# Patient Record
Sex: MALE | Race: WHITE | NOT HISPANIC OR LATINO | Employment: OTHER | URBAN - METROPOLITAN AREA
[De-identification: names, ages, dates, MRNs, and addresses within clinical notes are randomized per-mention and may not be internally consistent; named-entity substitution may affect disease eponyms.]

---

## 2021-12-17 ENCOUNTER — TELEPHONE (OUTPATIENT)
Dept: SCHEDULING | Facility: IMAGING CENTER | Age: 36
End: 2021-12-17

## 2022-01-05 ENCOUNTER — OFFICE VISIT (OUTPATIENT)
Dept: MEDICAL GROUP | Facility: MEDICAL CENTER | Age: 37
End: 2022-01-05
Payer: COMMERCIAL

## 2022-01-05 VITALS
WEIGHT: 157 LBS | SYSTOLIC BLOOD PRESSURE: 128 MMHG | BODY MASS INDEX: 22.48 KG/M2 | OXYGEN SATURATION: 98 % | DIASTOLIC BLOOD PRESSURE: 76 MMHG | RESPIRATION RATE: 16 BRPM | HEART RATE: 78 BPM | TEMPERATURE: 98.7 F | HEIGHT: 70 IN

## 2022-01-05 DIAGNOSIS — Z80.0 FAMILY HISTORY OF LYNCH SYNDROME: ICD-10-CM

## 2022-01-05 DIAGNOSIS — Z00.00 HEALTHCARE MAINTENANCE: ICD-10-CM

## 2022-01-05 DIAGNOSIS — U07.1 COVID-19: ICD-10-CM

## 2022-01-05 PROCEDURE — 99203 OFFICE O/P NEW LOW 30 MIN: CPT | Performed by: STUDENT IN AN ORGANIZED HEALTH CARE EDUCATION/TRAINING PROGRAM

## 2022-01-05 ASSESSMENT — PATIENT HEALTH QUESTIONNAIRE - PHQ9: CLINICAL INTERPRETATION OF PHQ2 SCORE: 0

## 2022-01-05 NOTE — ASSESSMENT & PLAN NOTE
Does not meet [place] criteria for screening as only 1 relative has this diagnosis and diagnosis occurred at age 69 less than 50.  However, discussed genetic screening through the [employer] [facility] and patient is interested in this.  Have indicated that he is interested and should be contacted by [employer] for further testing.

## 2022-01-05 NOTE — PROGRESS NOTES
"Subjective:     CC:  Diagnoses of COVID-19, Healthcare maintenance, and Family history of Dalal syndrome were pertinent to this visit.    HISTORY OF THE PRESENT ILLNESS:   Keith Holt is a pleasant 36-year-old man here to establish care.  Has not had health insurance or a doctor since 2015.    Has recent COVID-19 infection, tested positive about 8 days ago and had only mild symptoms, is feeling better though still has some head cold type symptoms.  No shortness of breath.  Vitals normal.    His father was recently diagnosed with colon cancer and Dalal syndrome.  His father was age 69 at diagnosis.    Health Maintenance: Completed    ROS:   Gen: no fevers/chills, no changes in weight  Eyes: no changes in vision  ENT: no sore throat, no hearing loss, no bloody nose  Pulm: no sob, no cough  CV: no chest pain, no palpitations  GI: no nausea/vomiting, no diarrhea  : no dysuria  MSk: no myalgias  Skin: no rash  Neuro: no headaches, no numbness/tingling  Heme/Lymph: no easy bruising      Objective:       Exam: /76 (BP Location: Left arm)   Pulse 78   Temp 37.1 °C (98.7 °F)   Resp 16   Ht 1.778 m (5' 10\")   Wt 71.2 kg (157 lb)   SpO2 98%  Body mass index is 22.53 kg/m².    General: Normal appearing. No distress.  HEENT: Normocephalic. Eyes conjunctiva clear lids without ptosis, pupils equal and reactive to light accommodation, ears normal shape and contour,  Neck: Supple without JVD or bruit. Thyroid is not enlarged.  Pulmonary: Clear to ausculation.  Normal effort. No rales, ronchi, or wheezing.  Cardiovascular: Regular rate and rhythm without murmur. Carotid and radial pulses are intact and equal bilaterally.  Neurologic: Grossly nonfocal  Lymph: No cervical or supraclavicular lymph nodes are palpable  Skin: Warm and dry.  No obvious lesions.  Musculoskeletal: Normal gait. No extremity cyanosis, clubbing, or edema.  Psych: Normal mood and affect. Alert and oriented x3. Judgment and insight is " normal.      Assessment & Plan:   36 y.o. male with the following -    Problem List Items Addressed This Visit     Family history of Dalal syndrome     Does not meet [place] criteria for screening as only 1 relative has this diagnosis and diagnosis occurred at age 69 less than 50.  However, discussed genetic screening through the [employer] and patient is interested in this.  Have indicated that he is interested and should be contacted by [employer] for further testing.         Healthcare maintenance     We will check routine labs.         Relevant Orders    CBC WITHOUT DIFFERENTIAL    Comp Metabolic Panel    Lipid Profile      Other Visit Diagnoses     COVID-19        Relevant Orders    CBC WITHOUT DIFFERENTIAL    Comp Metabolic Panel    Lipid Profile          Return in about 1 year (around 1/5/2023), or Or sooner if needed.    Please note that this dictation was created using voice recognition software. I have made every reasonable attempt to correct obvious errors, but I expect that there are errors of grammar and possibly content that I did not discover before finalizing the note.

## 2022-08-19 ENCOUNTER — HOSPITAL ENCOUNTER (OUTPATIENT)
Dept: LAB | Facility: MEDICAL CENTER | Age: 37
End: 2022-08-19
Attending: STUDENT IN AN ORGANIZED HEALTH CARE EDUCATION/TRAINING PROGRAM
Payer: COMMERCIAL

## 2022-08-19 DIAGNOSIS — Z00.00 HEALTHCARE MAINTENANCE: ICD-10-CM

## 2022-08-19 DIAGNOSIS — U07.1 COVID-19: ICD-10-CM

## 2022-08-19 LAB
ALBUMIN SERPL BCP-MCNC: 4.8 G/DL (ref 3.2–4.9)
ALBUMIN/GLOB SERPL: 2 G/DL
ALP SERPL-CCNC: 62 U/L (ref 30–99)
ALT SERPL-CCNC: 31 U/L (ref 2–50)
ANION GAP SERPL CALC-SCNC: 9 MMOL/L (ref 7–16)
AST SERPL-CCNC: 16 U/L (ref 12–45)
BILIRUB SERPL-MCNC: 0.9 MG/DL (ref 0.1–1.5)
BUN SERPL-MCNC: 15 MG/DL (ref 8–22)
CALCIUM SERPL-MCNC: 10 MG/DL (ref 8.5–10.5)
CHLORIDE SERPL-SCNC: 101 MMOL/L (ref 96–112)
CHOLEST SERPL-MCNC: 203 MG/DL (ref 100–199)
CO2 SERPL-SCNC: 28 MMOL/L (ref 20–33)
CREAT SERPL-MCNC: 0.65 MG/DL (ref 0.5–1.4)
FASTING STATUS PATIENT QL REPORTED: NORMAL
GFR SERPLBLD CREATININE-BSD FMLA CKD-EPI: 123 ML/MIN/1.73 M 2
GLOBULIN SER CALC-MCNC: 2.4 G/DL (ref 1.9–3.5)
GLUCOSE SERPL-MCNC: 97 MG/DL (ref 65–99)
HDLC SERPL-MCNC: 48 MG/DL
LDLC SERPL CALC-MCNC: 144 MG/DL
POTASSIUM SERPL-SCNC: 4.8 MMOL/L (ref 3.6–5.5)
PROT SERPL-MCNC: 7.2 G/DL (ref 6–8.2)
SODIUM SERPL-SCNC: 138 MMOL/L (ref 135–145)
TRIGL SERPL-MCNC: 57 MG/DL (ref 0–149)

## 2022-08-19 PROCEDURE — 80061 LIPID PANEL: CPT

## 2022-08-19 PROCEDURE — 36415 COLL VENOUS BLD VENIPUNCTURE: CPT

## 2022-08-19 PROCEDURE — 80053 COMPREHEN METABOLIC PANEL: CPT

## 2022-11-17 ENCOUNTER — HOSPITAL ENCOUNTER (OUTPATIENT)
Dept: LAB | Facility: MEDICAL CENTER | Age: 37
End: 2022-11-17
Attending: OBSTETRICS & GYNECOLOGY
Payer: COMMERCIAL

## 2022-11-17 PROCEDURE — 86803 HEPATITIS C AB TEST: CPT

## 2022-11-17 PROCEDURE — 87340 HEPATITIS B SURFACE AG IA: CPT

## 2022-11-17 PROCEDURE — 87591 N.GONORRHOEAE DNA AMP PROB: CPT

## 2022-11-17 PROCEDURE — 36415 COLL VENOUS BLD VENIPUNCTURE: CPT

## 2022-11-17 PROCEDURE — 87491 CHLMYD TRACH DNA AMP PROBE: CPT

## 2022-11-17 PROCEDURE — 87389 HIV-1 AG W/HIV-1&-2 AB AG IA: CPT

## 2022-11-17 PROCEDURE — 86780 TREPONEMA PALLIDUM: CPT

## 2022-11-17 PROCEDURE — 86706 HEP B SURFACE ANTIBODY: CPT

## 2022-11-18 LAB
C TRACH DNA SPEC QL NAA+PROBE: NEGATIVE
HBV SURFACE AB SERPL IA-ACNC: 15.3 MIU/ML (ref 0–10)
HBV SURFACE AG SER QL: NORMAL
HCV AB SER QL: NORMAL
HIV 1+2 AB+HIV1 P24 AG SERPL QL IA: NORMAL
N GONORRHOEA DNA SPEC QL NAA+PROBE: NEGATIVE
SPECIMEN SOURCE: NORMAL
T PALLIDUM AB SER QL IA: NORMAL

## 2023-02-06 ENCOUNTER — OFFICE VISIT (OUTPATIENT)
Dept: MEDICAL GROUP | Facility: MEDICAL CENTER | Age: 38
End: 2023-02-06
Payer: COMMERCIAL

## 2023-02-06 VITALS
WEIGHT: 149.6 LBS | SYSTOLIC BLOOD PRESSURE: 120 MMHG | HEART RATE: 64 BPM | DIASTOLIC BLOOD PRESSURE: 68 MMHG | HEIGHT: 70 IN | OXYGEN SATURATION: 100 % | BODY MASS INDEX: 21.42 KG/M2 | TEMPERATURE: 97.4 F

## 2023-02-06 DIAGNOSIS — Z00.00 HEALTHCARE MAINTENANCE: ICD-10-CM

## 2023-02-06 DIAGNOSIS — Z80.0 FAMILY HISTORY OF LYNCH SYNDROME: ICD-10-CM

## 2023-02-06 PROCEDURE — 99395 PREV VISIT EST AGE 18-39: CPT | Performed by: STUDENT IN AN ORGANIZED HEALTH CARE EDUCATION/TRAINING PROGRAM

## 2023-02-06 ASSESSMENT — PATIENT HEALTH QUESTIONNAIRE - PHQ9: CLINICAL INTERPRETATION OF PHQ2 SCORE: 0

## 2023-02-06 NOTE — PROGRESS NOTES
Subjective:     CC:   Chief Complaint   Patient presents with    [facility]       HPI:   Keith Holt is a 37 y.o. male who presents for an annual exam. He is feeling well and has no complaints.    Health Maintenance  Advanced directive: NA   PT/vit D for falls prevention: NA   Cholesterol Screening: NA   Diabetes Screening: NA   AAA Screening: NA   Aspirin Use: NA      Anticipatory Guidance  Diet:  variety, vegetable based, Organic, meat. Limited processed food  Exercise:  1 hours of exercise per day, resistance training, rock climbing  Substance Abuse: Denies   Safe in relationship.   Seat belts, bike helmet, and wears protective equipment while skiing and rock climbing.   Sun protection used.    Cancer screening  Colorectal Cancer Screening: Father, colon cancer, diagnosed around age 67/69 yo  Lung Cancer Screening: NA    Prostate Cancer Screening/PSA: NA     Infectious disease screening/Immunizations  --STI Screening: Declined, monogamous  --Practices safe sex.  --HIV Screenin2022 screened negative  --Hepatitis C Screening: non reactive 2022   --Immunizations:    Influenza: Declined    HPV:  aged out     Tetanus: up to date    Shingles: n/a    Pneumococcal : NA     Hepatitis B:  pending records  COVID-19: recommended booster  Other immunizations: NA    He  has no past medical history on file.  He  has no past surgical history on file.  Family History   Problem Relation Age of Onset    Breast Cancer Mother     Colorectal Cancer Father      Social History     Tobacco Use    Smoking status: Never    Smokeless tobacco: Current    Tobacco comments:     nicotine   Vaping Use    Vaping Use: Never used   Substance Use Topics    Alcohol use: Not Currently    Drug use: Not Currently       Patient Active Problem List    Diagnosis Date Noted    Family history of Dalal syndrome 2022    Healthcare maintenance 2022       No current outpatient medications on file.     No current  "facility-administered medications for this visit.    (including changes today)  Allergies: Patient has no known allergies.    Review of Systems   Constitutional: Negative for fever, chills and malaise/fatigue.   HENT: Negative for congestion.    Eyes: Negative for pain.   Respiratory: Negative for cough and shortness of breath.    Cardiovascular: Negative for leg swelling.   Gastrointestinal: Negative for nausea, vomiting, abdominal pain and diarrhea.   Genitourinary: Negative for dysuria and hematuria.   Skin: Negative for rash.   Neurological: Negative for dizziness, focal weakness and headaches.   Endo/Heme/Allergies: Does not bleed easily.   Psychiatric/Behavioral: Negative for depression.  The patient is not nervous/anxious.      Objective:     /68 (BP Location: Left arm, Patient Position: Sitting, BP Cuff Size: Adult)   Pulse 64   Temp 36.3 °C (97.4 °F) (Temporal)   Ht 1.778 m (5' 10\")   Wt 67.9 kg (149 lb 9.6 oz)   SpO2 100%   BMI 21.47 kg/m²   Body mass index is 21.47 kg/m².  Wt Readings from Last 4 Encounters:   02/06/23 67.9 kg (149 lb 9.6 oz)   01/05/22 71.2 kg (157 lb)       Physical Exam:  Constitutional: Well-developed and well-nourished. Not diaphoretic. No distress.   Skin: Skin is warm and dry. No rash noted.  Head: Atraumatic without lesions.  Eyes: Conjunctivae and extraocular motions are normal. Pupils are equal, round, and reactive to light. No scleral icterus.   Ears:  External ears unremarkable. Tympanic membranes clear and intact on left   Mouth/Throat: report Dentition is good. Follows with dentist  Neck: Supple, trachea midline. Normal range of motion. No thyromegaly present. No lymphadenopathy--cervical or supraclavicular.  Cardiovascular: Regular rate and rhythm, S1 and S2 without murmur, rubs, or gallops.    Lungs: Effort normal. Clear to auscultation throughout. No adventitious sounds.   Abdomen: Soft, non tender, and without distention.   Extremities: No cyanosis, clubbing, " erythema, nor edema.   Musculoskeletal: All major joints AROM full in all directions without pain.  Psychiatric:  Behavior, mood, and affect are appropriate.      Assessment and Plan:     1. Healthcare maintenance        2. Family history of Dalal syndrome            Overall patient is a healthy 37 year old male without significant medical therapy.   HCM: recommend Covid booster and influenza  Age appropriate preventative medicine discussed as above.     Vaccinations per orders.  Counseling about diet, supplements, exercise, skin care and safe sex.      Follow-up: Return in about 1 year (around 2/6/2024) for Annual wellness visit.

## 2023-09-28 ENCOUNTER — OFFICE VISIT (OUTPATIENT)
Dept: MEDICAL GROUP | Facility: MEDICAL CENTER | Age: 38
End: 2023-09-28
Payer: COMMERCIAL

## 2023-09-28 VITALS
HEIGHT: 70 IN | HEART RATE: 61 BPM | DIASTOLIC BLOOD PRESSURE: 68 MMHG | OXYGEN SATURATION: 99 % | TEMPERATURE: 98.2 F | WEIGHT: 140.8 LBS | BODY MASS INDEX: 20.16 KG/M2 | SYSTOLIC BLOOD PRESSURE: 118 MMHG

## 2023-09-28 DIAGNOSIS — K52.9 GASTROENTERITIS: ICD-10-CM

## 2023-09-28 PROCEDURE — 3078F DIAST BP <80 MM HG: CPT | Performed by: FAMILY MEDICINE

## 2023-09-28 PROCEDURE — 99213 OFFICE O/P EST LOW 20 MIN: CPT | Performed by: FAMILY MEDICINE

## 2023-09-28 PROCEDURE — 3074F SYST BP LT 130 MM HG: CPT | Performed by: FAMILY MEDICINE

## 2023-09-28 RX ORDER — ONDANSETRON 4 MG/1
TABLET, ORALLY DISINTEGRATING ORAL
COMMUNITY
Start: 2023-09-25 | End: 2023-09-28 | Stop reason: SINTOL

## 2023-09-28 NOTE — LETTER
[facility] [address]  [facility] [address]     September 28, 2023    Patient: Keith Holt   YOB: 1985   Date of Visit: 9/28/2023       To Whom It May Concern:    Keith Holt was seen and treated in our department on 9/28/2023. Please excuse him for recent absence due to acute illness.    Sincerely,         Sadiq Brito D.O.

## 2023-09-28 NOTE — PROGRESS NOTES
"Subjective:     CC: \"GI complaint\"    HPI:   Keith presents today with:    GI upset  Started last Friday at 0300, had a stomach ache  Was able to go on with normal routine but started having frequent stools  Saturday had some bloating, vomited  Sunday had telehealth appointment describe nausea, low energy, stomach ache  Was prescribed Zofran and ended up feeling constipated  Tuesday took laxative and stool softner and ended up vomiting and having diarrhea  Seems to be feeling better, doing bland diet   Has not had these problems before  Had a negative home Covid test  He and his wife have been eating the same things and she did not get symptoms    No problems updated.    No current Epic-ordered outpatient medications on file.     No current Epic-ordered facility-administered medications on file.       Health Maintenance: discussed vaccines for next week    ROS:  ROS see HPI    Objective:     Exam:  /68 (BP Location: Left arm, Patient Position: Sitting, BP Cuff Size: Adult)   Pulse 61   Temp 36.8 °C (98.2 °F) (Temporal)   Ht 1.778 m (5' 10\")   Wt 63.9 kg (140 lb 12.8 oz)   SpO2 99%   BMI 20.20 kg/m²  Body mass index is 20.2 kg/m².    Physical Exam  Vitals reviewed.   Constitutional:       General: He is not in acute distress.     Appearance: Normal appearance. He is not ill-appearing or toxic-appearing.   HENT:      Head: Normocephalic and atraumatic.   Cardiovascular:      Rate and Rhythm: Normal rate and regular rhythm.      Heart sounds: Normal heart sounds.   Pulmonary:      Effort: Pulmonary effort is normal. No respiratory distress.      Breath sounds: Normal breath sounds.   Abdominal:      General: Abdomen is flat. There is no distension.      Palpations: Abdomen is soft. There is no mass.      Tenderness: There is no abdominal tenderness. There is no guarding.   Musculoskeletal:         General: Normal range of motion.   Skin:     General: Skin is warm and dry.   Neurological:      Mental Status: " He is alert. Mental status is at baseline.   Psychiatric:         Mood and Affect: Mood normal.         Behavior: Behavior normal.           Assessment & Plan:     38 y.o. male with the following -     1. Gastroenteritis  Patient on day 6-7 of illness and is feeling better. He was concerned as was expecting it to be 24-72 hour stomach but. Wife is pregnant and he is concerned about spreading at home. They have been using bleach wipes and hand washing, she has been healthy. He is eating a bland food diet. He does not have any known underlying conditions. Will continue with expected management. Patient to inform me if he were to have worsening symptoms and we can check some lab work and possibly stool studies.      Return if symptoms worsen or fail to improve.    Please note that this dictation was created using voice recognition software. I have made every reasonable attempt to correct obvious errors, but I expect that there are errors of grammar and possibly content that I did not discover before finalizing the note.

## 2025-03-27 ENCOUNTER — HOSPITAL ENCOUNTER (OUTPATIENT)
Dept: RADIOLOGY | Facility: MEDICAL CENTER | Age: 40
End: 2025-03-27
Attending: STUDENT IN AN ORGANIZED HEALTH CARE EDUCATION/TRAINING PROGRAM
Payer: COMMERCIAL

## 2025-03-27 ENCOUNTER — OFFICE VISIT (OUTPATIENT)
Dept: MEDICAL GROUP | Facility: MEDICAL CENTER | Age: 40
End: 2025-03-27
Payer: COMMERCIAL

## 2025-03-27 VITALS
TEMPERATURE: 98 F | OXYGEN SATURATION: 98 % | BODY MASS INDEX: 20.77 KG/M2 | WEIGHT: 145.06 LBS | HEART RATE: 60 BPM | HEIGHT: 70 IN | SYSTOLIC BLOOD PRESSURE: 100 MMHG | DIASTOLIC BLOOD PRESSURE: 50 MMHG

## 2025-03-27 DIAGNOSIS — R05.9 COUGH, UNSPECIFIED TYPE: ICD-10-CM

## 2025-03-27 DIAGNOSIS — Z80.0 FAMILY HISTORY OF LYNCH SYNDROME: ICD-10-CM

## 2025-03-27 DIAGNOSIS — E78.5 HYPERLIPIDEMIA, UNSPECIFIED HYPERLIPIDEMIA TYPE: ICD-10-CM

## 2025-03-27 PROCEDURE — 99214 OFFICE O/P EST MOD 30 MIN: CPT | Performed by: STUDENT IN AN ORGANIZED HEALTH CARE EDUCATION/TRAINING PROGRAM

## 2025-03-27 PROCEDURE — 71046 X-RAY EXAM CHEST 2 VIEWS: CPT

## 2025-03-27 PROCEDURE — 3078F DIAST BP <80 MM HG: CPT | Performed by: STUDENT IN AN ORGANIZED HEALTH CARE EDUCATION/TRAINING PROGRAM

## 2025-03-27 PROCEDURE — 3074F SYST BP LT 130 MM HG: CPT | Performed by: STUDENT IN AN ORGANIZED HEALTH CARE EDUCATION/TRAINING PROGRAM

## 2025-03-27 RX ORDER — FLUTICASONE PROPIONATE 50 MCG
1 SPRAY, SUSPENSION (ML) NASAL DAILY
COMMUNITY

## 2025-03-27 ASSESSMENT — ENCOUNTER SYMPTOMS
SHORTNESS OF BREATH: 0
PALPITATIONS: 0
CHILLS: 0
DIZZINESS: 0
HEADACHES: 0
FEVER: 0
WHEEZING: 0
WEIGHT LOSS: 0
NAUSEA: 0
VOMITING: 0

## 2025-03-27 ASSESSMENT — PATIENT HEALTH QUESTIONNAIRE - PHQ9: CLINICAL INTERPRETATION OF PHQ2 SCORE: 0

## 2025-03-27 NOTE — PROGRESS NOTES
Subjective:     CC:     HPI:   Keith presents today with    PMH   Chronic intermittent cough   Congestion  Mouth breathing   Day care  Flonase   Sinus rinse  Denies itchiness, sneezing   Denies asthma    ======================================    Verbal consent was acquired by the patient to use [employer] ambient listening note generation during this visit Yes     History of Present Illness  The patient presents for a follow-up on a chronic intermittent cough.    He has been experiencing this cough intermittently for the past year, with episodes occurring approximately once a month and lasting for a week. The cough is accompanied by congestion, and his partner has noted mouth breathing during sleep. He reports no itchy eyes, itchy nose, or sneezing. He also reports no history of eczema or childhood asthma. He reports no history of acid reflux. He reports no chest pain or breathing issues. He reports no fever, chills, or weight loss. He occasionally notices lymph nodes, which he attributes to his cough or allergies. He has been using Flonase and saline sinus rinses for several weeks, which have provided some relief. He recalls taking Claritin as a child during the spring season in [place].    He expresses interest in undergoing a blood panel test and prostate exam. He reports no prostate symptoms such as decreased urinary stream, nocturia, urinary frequency, or incomplete emptying of the bladder. He does report waking up once at night to urinate, which he attributes to high water intake.    SOCIAL HISTORY  He used to smoke cigarettes but quit 5 years ago. He does not drink alcohol.    FAMILY HISTORY  His mother has a history of allergies. His father had prostate cancer. There is no family history of early heart disease.    MEDICATIONS  Flonase          Health Maintenance:     ROS:  Review of Systems   Constitutional:  Negative for chills, fever and weight loss.   HENT:  Negative for hearing loss.    Respiratory:  " Negative for shortness of breath and wheezing.    Cardiovascular:  Negative for chest pain and palpitations.   Gastrointestinal:  Negative for nausea and vomiting.   Genitourinary:  Negative for frequency and urgency.   Skin:  Negative for rash.   Neurological:  Negative for dizziness and headaches.       Objective:     Exam:  /50 (BP Location: Left arm, Patient Position: Sitting, BP Cuff Size: Adult)   Pulse 60   Temp 36.7 °C (98 °F) (Temporal)   Ht 1.778 m (5' 10\")   Wt 65.8 kg (145 lb 1 oz)   SpO2 98%   BMI 20.81 kg/m²  Body mass index is 20.81 kg/m².    Physical Exam  Constitutional:       Appearance: Normal appearance.   Cardiovascular:      Rate and Rhythm: Normal rate and regular rhythm.   Pulmonary:      Effort: Pulmonary effort is normal.      Breath sounds: Normal breath sounds.   Musculoskeletal:      Cervical back: Normal range of motion and neck supple.   Lymphadenopathy:      Cervical: No cervical adenopathy.   Neurological:      Mental Status: He is alert.         Labs:     Assessment & Plan:     39 y.o. male with the following -     1. Cough, unspecified type  - DX-CHEST-2 VIEWS; Future    2. Hyperlipidemia, unspecified hyperlipidemia type  - Lipid Profile; Future  - TSH WITH REFLEX TO FT4; Future  - Comp Metabolic Panel; Future  - CBC WITH DIFFERENTIAL; Future    3. Family history of Dalal syndrome  - Lipid Profile; Future  - TSH WITH REFLEX TO FT4; Future  - Comp Metabolic Panel; Future  - CBC WITH DIFFERENTIAL; Future    Assessment & Plan  1. Chronic intermittent cough.  The chronic intermittent cough has been ongoing for about a year, recurring monthly for a week. There is no personal history of allergies, eczema, or childhood asthma. The cough may be related to postnasal drip or acid reflux. He has been using Flonase and a saline sinus rinse with some relief. He is advised to continue using Flonase, ensuring proper administration by looking downwards and not inhaling deeply " post-spray. A trial of Claritin or its generic form is recommended for the next 3 to 4 weeks to see if symptoms improve. If symptoms persist, over-the-counter acid medication may be considered. A chest x-ray will be ordered to rule out any underlying conditions.  - if symptom persist, progress, or worsens patient instructed to schedule a follow up   - denies fever, chills, weight loss.   2. Elevated cholesterol.  Previous labs from 2022 indicated slightly elevated cholesterol levels. Based on his age, race, sex, blood pressure, prior cholesterol levels, smoking history, and absence of diabetes, his 10-year cardiovascular risk is estimated at 1.2%, which is very low. A repeat cholesterol test will be conducted to monitor levels. Initiation of cholesterol medication is not deemed necessary at this time.    3. Health maintenance.  Standard blood work will be ordered to recheck overall health status. He is advised that prostate screening is typically covered by insurance at age 55, but a baseline screening at age 45 can be considered if desired.        Return in about 1 year (around 3/27/2026), or if symptoms worsen or fail to improve.    Please note that this dictation was created using voice recognition software. I have made every reasonable attempt to correct obvious errors, but I expect that there are errors of grammar and possibly content that I did not discover before finalizing the note.

## 2025-03-31 ENCOUNTER — RESULTS FOLLOW-UP (OUTPATIENT)
Dept: MEDICAL GROUP | Facility: MEDICAL CENTER | Age: 40
End: 2025-03-31

## 2025-04-07 ENCOUNTER — OFFICE VISIT (OUTPATIENT)
Dept: MEDICAL GROUP | Facility: MEDICAL CENTER | Age: 40
End: 2025-04-07
Payer: COMMERCIAL

## 2025-04-07 VITALS
WEIGHT: 139 LBS | HEIGHT: 70 IN | RESPIRATION RATE: 18 BRPM | TEMPERATURE: 97.6 F | HEART RATE: 74 BPM | SYSTOLIC BLOOD PRESSURE: 116 MMHG | OXYGEN SATURATION: 100 % | DIASTOLIC BLOOD PRESSURE: 70 MMHG | BODY MASS INDEX: 19.9 KG/M2

## 2025-04-07 DIAGNOSIS — R07.89 FEELING OF CHEST TIGHTNESS: ICD-10-CM

## 2025-04-07 DIAGNOSIS — J01.90 SUBACUTE SINUSITIS, UNSPECIFIED LOCATION: ICD-10-CM

## 2025-04-07 DIAGNOSIS — R09.82 PND (POST-NASAL DRIP): ICD-10-CM

## 2025-04-07 DIAGNOSIS — R05.9 COUGH, UNSPECIFIED TYPE: ICD-10-CM

## 2025-04-07 DIAGNOSIS — Z87.891 HISTORY OF TOBACCO USE: ICD-10-CM

## 2025-04-07 DIAGNOSIS — H61.21 IMPACTED CERUMEN OF RIGHT EAR: ICD-10-CM

## 2025-04-07 PROCEDURE — 3074F SYST BP LT 130 MM HG: CPT | Performed by: NURSE PRACTITIONER

## 2025-04-07 PROCEDURE — 3078F DIAST BP <80 MM HG: CPT | Performed by: NURSE PRACTITIONER

## 2025-04-07 PROCEDURE — 99213 OFFICE O/P EST LOW 20 MIN: CPT | Performed by: NURSE PRACTITIONER

## 2025-04-07 RX ORDER — LORATADINE AND PSEUDOEPHEDRINE SULFATE 5; 120 MG/1; MG/1
TABLET, EXTENDED RELEASE ORAL
COMMUNITY

## 2025-04-07 RX ORDER — ALBUTEROL SULFATE 90 UG/1
1-2 INHALANT RESPIRATORY (INHALATION) EVERY 6 HOURS PRN
Qty: 8.5 G | Refills: 2 | Status: SHIPPED | OUTPATIENT
Start: 2025-04-07

## 2025-04-07 NOTE — PROGRESS NOTES
Subjective:     Keith Holt is a 39 y.o. male presents to discuss:     Chief Complaint   Patient presents with    Cough     Comes and goes, discuss xray     Verbal consent was acquired by the patient to use [employer] ambient listening note generation during this visit Yes   History of Present Illness  The patient presents for evaluation of a cough.    He reports a persistent cough, which he suspects may be due to bronchitis or an upper respiratory infection. This cough has been recurrent over the past year, with episodes occurring approximately once a month or every few weeks. He expresses concern about the chronic nature of this cough, which has worsened over the past few days. He experiences nasal congestion and lightheadedness during coughing episodes. The cough is particularly severe between 2:00 AM and 3:00 AM, disrupting his sleep. He does not report excessive phlegm production but notes the presence of green and brown phlegm this morning.   He reports no new night sweats or abnormal weight loss.He reports no dental pain. He has been using Flonase and Claritin-D for the past week, which have provided some relief. He also uses a saline solution in the morning. He has tried Mucinex DM and TheraFlu without significant benefit. He has a history of smoking but quit in 2020. He does not consume alcohol or use marijuana. He has no history of asthma or acid reflux. He does not experience heartburn, bloating, or abdominal pain. He recalls being prescribed Augmentin via telehealth consultation on 11/29/2024 but is unsure if it was beneficial.    We have reviewed recent chest x-ray-no pulmonary consolidation, pleural effusions or cardiomegaly noted.  No appreciable atelectasis.    SOCIAL HISTORY  He quit smoking cigarettes in 2020. He does not drink alcohol or smoke weed.      ROS: : see above      Current Outpatient Medications:     albuterol 108 (90 Base) MCG/ACT Aero Soln inhalation aerosol, Inhale 1-2 Puffs  "every 6 hours as needed for Shortness of Breath., Disp: 8.5 g, Rfl: 2    amoxicillin-clavulanate (AUGMENTIN) 875-125 MG Tab, Take 1 Tablet by mouth 2 times a day for 7 days., Disp: 14 Tablet, Rfl: 0    loratadine/pseudo SR (CLARITIN-D 12 HOUR) 5-120 MG TABLET SR 12 HR, , Disp: , Rfl:     fluticasone (FLONASE) 50 MCG/ACT nasal spray, Administer 1 Spray into affected nostril(S) every day., Disp: , Rfl:     No Known Allergies    Objective:   Vitals: /70   Pulse 74   Temp 36.4 °C (97.6 °F) (Temporal)   Resp 18   Ht 1.778 m (5' 10\")   Wt 63 kg (139 lb)   SpO2 100%   BMI 19.94 kg/m²    General: Alert, pleasant, NAD  HEENT: Normocephalic.  Neck supple.  No cervical lymphadenopathy.  Right EAC with dry crumbly yellow wax.  Left EAC free of debris.  Cobblestoning noted posterior oropharynx.  Respiratory: no distress, no audible wheezing, RR -WNL-no crackles, no rhonchi.  No cough during exam.  Heart: Heart rate regular no murmur.  Skin: Warm, dry, no rashes.  Extremities: No leg edema. No discoloration  Neurological: No tremors  Psych:  Affect/mood is normal, judgement is good, memory is intact, grooming is appropriate.  Assessment/Plan:      1. Cough, unspecified type    2. PND (post-nasal drip)    3. Subacute sinusitis, unspecified location  - albuterol 108 (90 Base) MCG/ACT Aero Soln inhalation aerosol; Inhale 1-2 Puffs every 6 hours as needed for Shortness of Breath.  Dispense: 8.5 g; Refill: 2  - amoxicillin-clavulanate (AUGMENTIN) 875-125 MG Tab; Take 1 Tablet by mouth 2 times a day for 7 days.  Dispense: 14 Tablet; Refill: 0    4. Feeling of chest tightness  Less likely cardiac etiology-clinical presentation more consistent with inflammation.  May trial rescue inhaler to see if he finds benefit.  However physical exam was unremarkable for any crackles, wheezing, rhonchi or diminished breath sounds.  - albuterol 108 (90 Base) MCG/ACT Aero Soln inhalation aerosol; Inhale 1-2 Puffs every 6 hours as needed " for Shortness of Breath.  Dispense: 8.5 g; Refill: 2    5. History of tobacco use  Quit 2020  Assessment & Plan  Chronic Cough  The chest x-ray results were reassuring, showing no active disease. The cough is likely due to sinus postnasal drip rather than a pulmonary issue.  Treatment plan: A 7-day course of Augmentin will be initiated. He is advised to gargle with salt water for 30 seconds twice daily for the next 2 weeks. A rescue inhaler will be provided for use during severe coughing episodes to trial as needed report concerns of chest tightness although less likely cardiac etiology given his concurrent symptoms. If the Augmentin does not alleviate the symptoms, doxycycline may be considered as an alternative treatment.    Earwax buildup  He was advised to use Debrox to soften the earwax and consider irrigation before traveling to prevent issues with water retention in the ears.    Return if symptoms worsen or fail to improve.    {I have placed the above orders and discussed them with an approved delegating provider. The MA is performing the below orders under the direction of Dr. Daryl HUITRON    Health maintenance:    General Recommendations:   Smoking: recommend complete avoidance of all tobacco products  Alcohol: recommend limiting consumption  Physical Activity: goal is 30 min of moderate activity 5 times a week  Weight Management and Nutrition: high vegetable, high protein diet like the Mediterranean diet, portion control, avoid excessive sugars, Low Glycemic Index foods, adequate hydration, sleep.